# Patient Record
Sex: MALE | Race: WHITE | NOT HISPANIC OR LATINO | Employment: FULL TIME | URBAN - METROPOLITAN AREA
[De-identification: names, ages, dates, MRNs, and addresses within clinical notes are randomized per-mention and may not be internally consistent; named-entity substitution may affect disease eponyms.]

---

## 2024-07-31 ENCOUNTER — HOSPITAL ENCOUNTER (EMERGENCY)
Facility: MEDICAL CENTER | Age: 64
End: 2024-07-31
Attending: EMERGENCY MEDICINE
Payer: COMMERCIAL

## 2024-07-31 ENCOUNTER — APPOINTMENT (OUTPATIENT)
Dept: RADIOLOGY | Facility: MEDICAL CENTER | Age: 64
End: 2024-07-31
Attending: EMERGENCY MEDICINE
Payer: COMMERCIAL

## 2024-07-31 VITALS
HEART RATE: 87 BPM | RESPIRATION RATE: 16 BRPM | SYSTOLIC BLOOD PRESSURE: 156 MMHG | WEIGHT: 248.9 LBS | TEMPERATURE: 97.1 F | DIASTOLIC BLOOD PRESSURE: 93 MMHG | OXYGEN SATURATION: 96 %

## 2024-07-31 DIAGNOSIS — S96.912A STRAIN OF LEFT ANKLE, INITIAL ENCOUNTER: ICD-10-CM

## 2024-07-31 PROCEDURE — 99283 EMERGENCY DEPT VISIT LOW MDM: CPT

## 2024-07-31 PROCEDURE — 73610 X-RAY EXAM OF ANKLE: CPT | Mod: LT

## 2024-07-31 RX ORDER — IBUPROFEN 800 MG/1
800 TABLET, FILM COATED ORAL EVERY 8 HOURS PRN
Qty: 30 TABLET | Refills: 0 | Status: SHIPPED | OUTPATIENT
Start: 2024-07-31

## 2024-07-31 NOTE — ED TRIAGE NOTES
Chief Complaint   Patient presents with    Foot Pain     Left heel and ankle x 2 months. Reports pain comes and goes. Worse today     Pt ambulates to triage with above complaint.   BP (!) 155/102   Pulse 85   Temp 36.2 °C (97.1 °F) (Temporal)   Resp 16   Wt 113 kg (248 lb 14.4 oz)   SpO2 97%   Pt informed of wait times. Educated on triage process.  Asked to return to triage RN for any new or worsening of symptoms. Thanked for patience.

## 2024-07-31 NOTE — ED NOTES
AIRCAST applied to injured extremity.  CMS checked before and after application. Pt educated on use and care. Pt verbalized understanding.

## 2024-07-31 NOTE — ED NOTES
Pt discharged with instructions, pt verbalized understanding. Pt sent home with 1 prescription. Discharged  by wheelchair with family.

## 2024-07-31 NOTE — ED PROVIDER NOTES
"ED Provider Note    CHIEF COMPLAINT  Chief Complaint   Patient presents with    Foot Pain     Left heel and ankle x 2 months. Reports pain comes and goes. Worse today       EXTERNAL RECORDS REVIEWED  Outpatient Notes none    HPI/ROS  LIMITATION TO HISTORY   None  OUTSIDE HISTORIAN(S):  None    Servando Bright is a 64 y.o. male who presents here for evaluation of left ankle pain.  Patient states he has had this ankle pain intermittently for the last 2 months.  He states that it comes and goes, and gets \"better and worse.\"  He has no fever chills no vomiting, he states that when he is not moving around or walk the pain is fine.  He states that when he puts weight on the ankle, it causes pain.  This happens intermittently.  He was doing so today.  He has no fevers, trauma, or other medical concerns.    PAST MEDICAL HISTORY   No bleeding disorders    SURGICAL HISTORY  patient denies any surgical history    FAMILY HISTORY  History reviewed. No pertinent family history.    SOCIAL HISTORY  Social History     Tobacco Use    Smoking status: Never    Smokeless tobacco: Never   Substance and Sexual Activity    Alcohol use: Not Currently    Drug use: Not Currently    Sexual activity: Not on file       CURRENT MEDICATIONS  Home Medications       Reviewed by Rhoda Marsh R.N. (Registered Nurse) on 07/31/24 at 1156  Med List Status: Partial     Medication Last Dose Status        Patient Robert Taking any Medications                         Audit from Redirected Encounters    **Home medications have not yet been reviewed for this encounter**         ALLERGIES  No Known Allergies    PHYSICAL EXAM  VITAL SIGNS: BP (!) 155/102   Pulse 85   Temp 36.2 °C (97.1 °F) (Temporal)   Resp 16   Wt 113 kg (248 lb 14.4 oz)   SpO2 97%    Constitutional: Well developed, well nourished. No acute distress.  HEENT: Normocephalic, atraumatic. Posterior pharynx clear and moist.  Eyes:  EOMI. Normal sclera.  Neck: Supple, Full range " of motion, nontender.  Chest/Pulmonary: clear to ausculation. Symmetrical expansion.   Back: No CVA tenderness, nontender midline, no step offs.  Musculoskeletal: No deformity, no edema, neurovascular intact.  Left lower extremity; tenderness to the lateral malleolus with palpation, good range of motion, nontender left knee, nontender foot.  No erythema, ecchymosis or edema.  Patient has DPP present.  Neuro: Clear speech, appropriate, cooperative, cranial nerves II-XII grossly intact.  Psych: Normal mood and affect      EKG/LABS  None    RADIOLOGY/PROCEDURES   I have independently interpreted the diagnostic imaging associated with this visit and am waiting the final reading from the radiologist.   My preliminary interpretation is as follows: Below    Radiologist interpretation:  DX-ANKLE 3+ VIEWS LEFT   Final Result      1. No acute findings.   2. 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   3. Dorsal calcaneal spur.          COURSE & MEDICAL DECISION MAKING    ASSESSMENT, COURSE AND PLAN  Care Narrative: Patient is nontoxic and afebrile comfortable.  He does have arthritic changes noted in the x-ray.  He will follow-up as outpatient or return for any further issues or concerns.  We will do Aircast and crutches, and he will follow-up.  I am not suspecting septic joint or septic arthritis at this time, as the patient has no fever, he looks well, and has good range of motion at the joint.        DISPOSITION AND DISCUSSIONS  I have discussed management of the patient with the following physicians and MARIA ISABEL's: None    Discussion of management with other QHP or appropriate source(s): None    Escalation of care considered, and ultimately not performed: None    Barriers to care at this time, including but not limited to: Patient does not have established PCP.     Decision tools and prescription drugs considered including, but not limited to: None.    FINAL DIAGNOSIS  1. Strain of left  ankle, initial encounter         Electronically signed by: Ryan Menendez D.O., 7/31/2024 1:26 PM

## 2024-07-31 NOTE — Clinical Note
Servando Deangelo was seen and treated in our emergency department on 7/31/2024.  He may return to work on 08/03/2024.       If you have any questions or concerns, please don't hesitate to call.      Ryan Menendez D.O.